# Patient Record
Sex: FEMALE | Race: WHITE | NOT HISPANIC OR LATINO | Employment: FULL TIME | URBAN - METROPOLITAN AREA
[De-identification: names, ages, dates, MRNs, and addresses within clinical notes are randomized per-mention and may not be internally consistent; named-entity substitution may affect disease eponyms.]

---

## 2018-08-01 ENCOUNTER — TELEPHONE (OUTPATIENT)
Dept: PAIN MEDICINE | Facility: MEDICAL CENTER | Age: 38
End: 2018-08-01

## 2018-08-01 NOTE — TELEPHONE ENCOUNTER
Patient called stating she is being referred by Dr Tiki Davila  Patient has [facility] id# [id number]  No prior pain mgmt  Not WC/ MVA related  PCP Dr Sacha Garcia @ [phone]     Per office ok to overbook patient to be seen tomorrow @ 8am  Patient will arrive early to complete forms  Patient will also bring office notes from Dr Tiki Davila and a copy of her MRI

## 2018-08-02 ENCOUNTER — OFFICE VISIT (OUTPATIENT)
Dept: PAIN MEDICINE | Facility: CLINIC | Age: 38
End: 2018-08-02
Payer: COMMERCIAL

## 2018-08-02 VITALS
SYSTOLIC BLOOD PRESSURE: 112 MMHG | DIASTOLIC BLOOD PRESSURE: 74 MMHG | HEART RATE: 85 BPM | HEIGHT: 64 IN | BODY MASS INDEX: 23.05 KG/M2 | WEIGHT: 135 LBS

## 2018-08-02 DIAGNOSIS — M54.16 LUMBAR RADICULOPATHY: ICD-10-CM

## 2018-08-02 DIAGNOSIS — M51.26 LUMBAR DISC HERNIATION: Primary | ICD-10-CM

## 2018-08-02 PROCEDURE — 99244 OFF/OP CNSLTJ NEW/EST MOD 40: CPT | Performed by: ANESTHESIOLOGY

## 2018-08-02 RX ORDER — DIAZEPAM 5 MG/1
TABLET ORAL
Refills: 0 | COMMUNITY
Start: 2018-07-28 | End: 2018-09-14

## 2018-08-02 RX ORDER — LIDOCAINE 50 MG/G
PATCH TOPICAL
Refills: 1 | COMMUNITY
Start: 2018-07-28 | End: 2018-11-27

## 2018-08-02 RX ORDER — CYCLOBENZAPRINE HCL 10 MG
TABLET ORAL
Refills: 0 | COMMUNITY
Start: 2018-07-28 | End: 2018-08-14 | Stop reason: ALTCHOICE

## 2018-08-02 NOTE — PROGRESS NOTES
Assessment:  1  Lumbar disc herniation    2  Lumbar radiculopathy - Left        Plan:    The patient's pain persists despite time, relative rest, activity modification and therapy  I believe that she would benefit from a lumbar epidural steroid injection to diminish any inflammatory component of her pain  I will initially use a transforaminal approach to better concentrate the steroid along the affected nerve root, left L4/L5  The injection may need to be repeated based on the degree of pain relief following the initial injection  In the office today, we reviewed the nature of the patient's pathology in depth using  diagrams and models  I discussed the approach I would use for the epidural steroid injection and provided literature for home review  The patient understands the risks associated with the procedure including but not limited to bleeding, infection, tissue injury, exacerbation of symptoms, allergic reaction, spinal headache, and paralysis and provided written and verbal consent  today  My impressions and treatment recommendations were discussed in detail with the patient who verbalized understanding and had no further questions  Discharge instructions were provided  I personally saw and examined the patient and I agree with the above discussed plan of care  Orders Placed This Encounter   Procedures    FL spine and pain procedure     Standing Status:   Future     Standing Expiration Date:   8/2/2022     Order Specific Question:   Reason for Exam:     Answer:   Left L4/L5 transforaminal epidural steroid injection #1     Order Specific Question:   Is the patient pregnant? Answer:   Unknown     Order Specific Question:   Anticoagulant hold needed?      Answer:   no    FL spine and pain procedure     Standing Status:   Future     Standing Expiration Date:   8/2/2022     Order Specific Question:   Reason for Exam:     Answer:   Left L4/L5 transforaminal epidural steroid injection #2 Order Specific Question:   Is the patient pregnant? Answer:   Unknown     Order Specific Question:   Anticoagulant hold needed? Answer:   no     New Medications Ordered This Visit   Medications    lidocaine (LIDODERM) 5 %     Sig: YENIFER 1 PATCH TOPICALLY D  MAY WEAR UP TO 12 HOURS  Refill:  1    diazepam (VALIUM) 5 mg tablet     Sig: TK 1 T PO  TID PRF MUSCLE SPASM     Refill:  0    cyclobenzaprine (FLEXERIL) 10 mg tablet     Sig: TK 1 T PO  TID PRF MUSCLE SPASMS     Refill:  0       Referred by Pepe       History of Present Illness:    Tree Viramontes is a 41 y o  female who in 2015 fell over step ladder on her back went to the emergency department she had a flare-up of her pain over the years her pain is been worsening into recently she was hospitalized 1 week ago  She has started physical therapy but she has severe pain radiating down the posterior lateral aspect of her left leg which she describes cramping shooting sharp with a numbing sensation  She notes that lying down and relaxation decreases symptoms while bending sitting walking exercise all aggravate them  Her pain is severe she rates it between 6 to 10/10 on the visual analog scale significantly interfering with daily living activities  Her pain is constant worse in the morning in the evening she has a cane walker for ambulation  Physical therapy heat nice provides moderate relief for TENS unit provides excellent relief  She is taking nonsteroidal anti-inflammatories muscle relaxants and lidocaine patch  I have personally reviewed and/or updated the patient's past medical history, past surgical history, family history, social history, current medications, allergies, and vital signs today  Review of Systems:    Review of Systems   Constitutional: Negative for fever and unexpected weight change  HENT: Negative for trouble swallowing  Eyes: Negative for visual disturbance     Respiratory: Negative for shortness of breath and wheezing  Cardiovascular: Negative for chest pain and palpitations  Gastrointestinal: Negative for constipation, diarrhea, nausea and vomiting  Endocrine: Negative for cold intolerance, heat intolerance and polydipsia  Genitourinary: Negative for difficulty urinating and frequency  Musculoskeletal: Positive for gait problem and joint swelling (joint stiffness)  Negative for arthralgias and myalgias  Skin: Negative for rash  Neurological: Positive for dizziness and weakness  Negative for seizures, syncope and headaches  Hematological: Does not bruise/bleed easily  Psychiatric/Behavioral: Positive for dysphoric mood  All other systems reviewed and are negative  Patient Active Problem List   Diagnosis    Lumbar radiculopathy - Left    Lumbar disc herniation       No past medical history on file  Past Surgical History:   Procedure Laterality Date     SECTION         Family History   Problem Relation Age of Onset    Cancer Father        Social History     Occupational History    Not on file  Social History Main Topics    Smoking status: Current Every Day Smoker    Smokeless tobacco: Never Used    Alcohol use Yes    Drug use: Yes     Types: Marijuana    Sexual activity: Not Currently       No current outpatient prescriptions on file prior to visit  No current facility-administered medications on file prior to visit  Allergies   Allergen Reactions    Percocet [Oxycodone-Acetaminophen]        Physical Exam:    /74   Pulse 85   Ht 5' 4" (1 626 m)   Wt 61 2 kg (135 lb)   BMI 23 17 kg/m²     Constitutional: normal, well developed, well nourished, alert, in no distress and non-toxic and no overt pain behavior    Eyes: anicteric  HEENT: grossly intact  Neck: supple, symmetric, trachea midline and no masses   Pulmonary:even and unlabored  Cardiovascular:No edema or pitting edema present  Skin:Normal without rashes or lesions and well hydrated  Psychiatric:Mood and affect appropriate  Neurologic:Cranial Nerves II-XII grossly intact  Musculoskeletal:  Difficulty going from sitting to standing to sitting position, no obvious skin lesions or erythema lumbar sacral spine mild tenderness along the left PSIS negative on the right no paraspinal tenderness sacroiliac tenderness or greater trochanteric tenderness bilateral, deep tendon reflexes diminished left patella compared to the right symmetrical bilateral Achilles no sensory deficits are appreciated no focal motor deficits are appreciated the lower limbs positive straight leg raising on the left negative on the right    Imaging  MRI lumbar spine wo con 07/27/18             REPORT STATUS: Signed  Indication: Low back  Left leg pain  Comparison:None     Findings: Multiplanar MR examination of the lumbar spine performed  The termination of the conus medullaris is at the level of L1  The conus is normal in appearance  There is a 2 2 cm cyst/dominant follicle at the left ovary  L1-L2 interspace: The disc is well maintained  There is no disc bulging or herniation  The spinal canal is of normal dimensions  The neural foramina are patent  L2-L3 interspace: The disc is well-maintained  There is no disc bulging or herniation  The spinal canal and neural foramina are patent  L3-L4 interspace: There is degenerative disc desiccation  There is a small central herniation of disc material  The protruding disc impresses on the ventral aspect of the thecal sac  There is no critical central canal stenosis or foraminal stenosis  L4-L5 interspace: There is a large left paracentral herniation of disc material  There is significant central left and compression on the thecal sac  There is stenosis at the left lateral recess (image 4/series 5 corresponding to images 11-12/series 9)   There is Modic type I marrow reactive signal at the anterior inferior endplate of L4 and the anterior superior endplate of L5    L5-S1 interspace: There is a small central disc herniation  The disc protrudes into the normally prominent epidural fat  There is no impression on the thecal sac  There is no nerve root impingement identified  Conclusion:   1  Large left paracentral HNP, L4-L5 level  2  Small central disc herniations, L3-L4 and L5-S1 levels  XR lumbar spine X-Ray 07/26/18                Indication: Pain    AP, lateral and bilateral oblique views of the lumbar spine without comparison  Bone density is normal  No fracture, subluxation or bone lesion  There is loss of the normal lumbar lordosis  There is mild narrowing of the disc space at L4-5 with small endplate osteophytes  No spondylolysis  Sacroiliac joints appear normal and symmetric without erosions  Sacrum appears unremarkable  Impression:    Mild degenerative disc disease at L4-5  I have personally reviewed pertinent films in PACS

## 2018-08-14 ENCOUNTER — HOSPITAL ENCOUNTER (OUTPATIENT)
Dept: RADIOLOGY | Facility: CLINIC | Age: 38
Discharge: HOME | End: 2018-08-14
Payer: COMMERCIAL

## 2018-08-14 VITALS
RESPIRATION RATE: 18 BRPM | HEART RATE: 74 BPM | OXYGEN SATURATION: 100 % | TEMPERATURE: 97.6 F | DIASTOLIC BLOOD PRESSURE: 76 MMHG | SYSTOLIC BLOOD PRESSURE: 118 MMHG

## 2018-08-14 DIAGNOSIS — M62.838 MUSCLE SPASM: Primary | ICD-10-CM

## 2018-08-14 DIAGNOSIS — M54.16 LUMBAR RADICULOPATHY: ICD-10-CM

## 2018-08-14 DIAGNOSIS — M51.26 LUMBAR DISC HERNIATION: ICD-10-CM

## 2018-08-14 PROCEDURE — 64484 NJX AA&/STRD TFRM EPI L/S EA: CPT | Performed by: ANESTHESIOLOGY

## 2018-08-14 PROCEDURE — 64483 NJX AA&/STRD TFRM EPI L/S 1: CPT | Performed by: ANESTHESIOLOGY

## 2018-08-14 RX ORDER — CYCLOBENZAPRINE HCL 10 MG
10 TABLET ORAL 3 TIMES DAILY PRN
Qty: 60 TABLET | Refills: 0 | Status: SHIPPED | OUTPATIENT
Start: 2018-08-14 | End: 2018-09-14 | Stop reason: SDUPTHER

## 2018-08-14 RX ORDER — PAPAVERINE HCL 150 MG
20 CAPSULE, EXTENDED RELEASE ORAL ONCE
Status: COMPLETED | OUTPATIENT
Start: 2018-08-14 | End: 2018-08-14

## 2018-08-14 RX ORDER — IBUPROFEN 200 MG
TABLET ORAL EVERY 6 HOURS PRN
COMMUNITY
End: 2019-10-18

## 2018-08-14 RX ORDER — LIDOCAINE HYDROCHLORIDE 10 MG/ML
5 INJECTION, SOLUTION EPIDURAL; INFILTRATION; INTRACAUDAL; PERINEURAL ONCE
Status: COMPLETED | OUTPATIENT
Start: 2018-08-14 | End: 2018-08-14

## 2018-08-14 RX ORDER — COVID-19 ANTIGEN TEST
220 KIT MISCELLANEOUS AS NEEDED
COMMUNITY
End: 2019-10-18

## 2018-08-14 RX ADMIN — IOHEXOL 1 ML: 300 INJECTION, SOLUTION INTRAVENOUS at 10:45

## 2018-08-14 RX ADMIN — LIDOCAINE HYDROCHLORIDE 3 ML: 10 INJECTION, SOLUTION EPIDURAL; INFILTRATION; INTRACAUDAL; PERINEURAL at 10:33

## 2018-08-14 RX ADMIN — DEXAMETHASONE SODIUM PHOSPHATE 20 MG: 10 INJECTION, SOLUTION INTRAMUSCULAR; INTRAVENOUS at 10:33

## 2018-08-14 RX ADMIN — LIDOCAINE HYDROCHLORIDE 3 ML: 20 INJECTION, SOLUTION EPIDURAL; INFILTRATION; INTRACAUDAL at 10:34

## 2018-08-14 NOTE — DISCHARGE INSTR - LAB
Epidural Steroid Injection   WHAT YOU NEED TO KNOW:   An epidural steroid injection (PATRICIA) is a procedure to inject steroid medicine into the epidural space  The epidural space is between your spinal cord and vertebrae  Steroids reduce inflammation and fluid buildup in your spine that may be causing pain  You may be given pain medicine along with the steroids  ACTIVITY  · Do not drive or operate machinery today  · No strenuous activity today - bending, lifting, etc   · You may resume normal activites starting tomorrow - start slowly and as tolerated  · You may shower today, but no tub baths or hot tubs  · You may have numbness for several hours from the local anesthetic  Please use caution and common sense, especially with weight-bearing activities  CARE OF THE INJECTION SITE  · If you have soreness or pain, apply ice to the area today (20 minutes on/20 minutes off)  · Starting tomorrow, you may use warm, moist heat or ice if needed  · You may have an increase or change in your discomfort for 36-48 hours after your treatment  · Apply ice and continue with any pain medication you have been prescribed  · Notify [facility] if you have any of the following: redness, drainage, swelling, headache, stiff neck or fever above 100°F     SPECIAL INSTRUCTIONS  · Our office will contact you in approximately 7 days for a progress report  MEDICATIONS  · Continue to take all routine medications  · Our office may have instructed you to hold some medications  If you have a problem specifically related to your procedure, please call our office at [phone]  Problems not related to your procedure should be directed to your primary care physician

## 2018-08-14 NOTE — H&P
History of Present Illness: The patient is a 41 y o  female who presents with complaints of low back and leg pain  Patient Active Problem List   Diagnosis    Lumbar radiculopathy - Left    Lumbar disc herniation       No past medical history on file  Past Surgical History:   Procedure Laterality Date     SECTION           Current Outpatient Prescriptions:     ibuprofen (MOTRIN) 200 mg tablet, Take by mouth every 6 (six) hours as needed for mild pain, Disp: , Rfl:     Naproxen Sodium (ALEVE) 220 MG CAPS, Take 220 mg by mouth as needed, Disp: , Rfl:     cyclobenzaprine (FLEXERIL) 10 mg tablet, TK 1 T PO  TID PRF MUSCLE SPASMS, Disp: , Rfl: 0    diazepam (VALIUM) 5 mg tablet, TK 1 T PO  TID PRF MUSCLE SPASM, Disp: , Rfl: 0    lidocaine (LIDODERM) 5 %, YENIFER 1 PATCH TOPICALLY D  MAY WEAR UP TO 12 HOURS , Disp: , Rfl: 1    Allergies   Allergen Reactions    Percocet [Oxycodone-Acetaminophen]        Physical Exam:   Vitals:    18 1013   BP: 118/76   Pulse: 74   Resp: 18   Temp: 97 6 °F (36 4 °C)   SpO2: 100%     General: Awake, Alert, Oriented x 3, Mood and affect appropriate  Respiratory: Respirations even and unlabored  Cardiovascular: Peripheral pulses intact; no edema  Musculoskeletal Exam:  Decreased range of motion lumbar spine    ASA Score: I    Patient/Chart Verification  Patient ID Verified: Verbal  Consents Confirmed: Procedural, To be obtained in the Pre-Procedure area  H&P( within 30 days) Verified: To be obtained in the Pre-Procedure area  Interval H&P(within 24 hr) Complete (required for Outpatients and Surgery Admit only): To be obtained in the Pre-Procedure area  Allergies Reviewed: Yes  Anticoag/NSAID held?: NA  Currently on antibiotics?: No  Pregnancy denied?: Yes    Assessment:   1  Lumbar disc herniation    2   Lumbar radiculopathy - Left        Plan: Left L4/L5 transforaminal epidural steroid injection #1

## 2018-08-23 ENCOUNTER — TELEPHONE (OUTPATIENT)
Dept: RADIOLOGY | Facility: CLINIC | Age: 38
End: 2018-08-23

## 2018-08-23 NOTE — TELEPHONE ENCOUNTER
S/w pt to obtain her new [facility] [employer] that pt states should be effective 8/19/2018 for her upcoming # 2 TFESI procedure  Pt states she did not receive the information as of yet, advised pt to try to obtain and rtc to [facility]  so we can start the authorization process for her procedure or we might have to r/s her appt  Pt states she will try to get

## 2018-08-28 NOTE — TELEPHONE ENCOUNTER
Called pt she did not receive her insurance card yet, checked the [employer] and obtained ID #, started auth with [employer] then called [place] to complete the eligibility process with [employer] and got authorization approved for proc on 8/30/2018

## 2018-08-30 ENCOUNTER — HOSPITAL ENCOUNTER (OUTPATIENT)
Dept: RADIOLOGY | Facility: CLINIC | Age: 38
Discharge: HOME | End: 2018-08-30
Payer: COMMERCIAL

## 2018-08-30 VITALS
HEART RATE: 84 BPM | TEMPERATURE: 98.2 F | SYSTOLIC BLOOD PRESSURE: 132 MMHG | DIASTOLIC BLOOD PRESSURE: 79 MMHG | WEIGHT: 135 LBS | OXYGEN SATURATION: 100 % | BODY MASS INDEX: 23.17 KG/M2 | RESPIRATION RATE: 18 BRPM

## 2018-08-30 DIAGNOSIS — M51.26 LUMBAR DISC HERNIATION: ICD-10-CM

## 2018-08-30 DIAGNOSIS — M54.16 LUMBAR RADICULOPATHY: ICD-10-CM

## 2018-08-30 PROCEDURE — 64483 NJX AA&/STRD TFRM EPI L/S 1: CPT | Performed by: ANESTHESIOLOGY

## 2018-08-30 PROCEDURE — 64484 NJX AA&/STRD TFRM EPI L/S EA: CPT | Performed by: ANESTHESIOLOGY

## 2018-08-30 RX ORDER — PAPAVERINE HCL 150 MG
20 CAPSULE, EXTENDED RELEASE ORAL ONCE
Status: DISCONTINUED | OUTPATIENT
Start: 2018-08-30 | End: 2018-09-03 | Stop reason: HOSPADM

## 2018-08-30 RX ORDER — ESCITALOPRAM OXALATE 10 MG/1
10 TABLET ORAL DAILY
COMMUNITY

## 2018-08-30 RX ORDER — LIDOCAINE HYDROCHLORIDE 10 MG/ML
5 INJECTION, SOLUTION EPIDURAL; INFILTRATION; INTRACAUDAL; PERINEURAL ONCE
Status: DISCONTINUED | OUTPATIENT
Start: 2018-08-30 | End: 2018-09-03 | Stop reason: HOSPADM

## 2018-08-30 RX ADMIN — IOHEXOL 1 ML: 300 INJECTION, SOLUTION INTRAVENOUS at 11:15

## 2018-08-30 NOTE — H&P
History of Present Illness: The patient is a 41 y o  female who presents with complaints of low back and leg pain  Patient Active Problem List   Diagnosis    Lumbar radiculopathy - Left    Lumbar disc herniation       No past medical history on file  Past Surgical History:   Procedure Laterality Date     SECTION           Current Outpatient Prescriptions:     cyclobenzaprine (FLEXERIL) 10 mg tablet, Take 1 tablet (10 mg total) by mouth 3 (three) times a day as needed for muscle spasms, Disp: 60 tablet, Rfl: 0    diazepam (VALIUM) 5 mg tablet, TK 1 T PO  TID PRF MUSCLE SPASM, Disp: , Rfl: 0    ibuprofen (MOTRIN) 200 mg tablet, Take by mouth every 6 (six) hours as needed for mild pain, Disp: , Rfl:     lidocaine (LIDODERM) 5 %, YENIFER 1 PATCH TOPICALLY D  MAY WEAR UP TO 12 HOURS , Disp: , Rfl: 1    Naproxen Sodium (ALEVE) 220 MG CAPS, Take 220 mg by mouth as needed, Disp: , Rfl:     Allergies   Allergen Reactions    Percocet [Oxycodone-Acetaminophen]        Physical Exam:   Vitals:    18 1057   BP: 114/78   Pulse: 87   Resp: 18   Temp: 98 2 °F (36 8 °C)   SpO2: 100%     General: Awake, Alert, Oriented x 3, Mood and affect appropriate  Respiratory: Respirations even and unlabored  Cardiovascular: Peripheral pulses intact; no edema  Musculoskeletal Exam:  Decreased range of motion lumbar spine    ASA Score: II    Patient/Chart Verification  Patient ID Verified: Verbal  ID Band Applied: No  Consents Confirmed: To be obtained in the Pre-Procedure area  H&P( within 30 days) Verified: To be obtained in the Pre-Procedure area  Interval H&P(within 24 hr) Complete (required for Outpatients and Surgery Admit only): To be obtained in the Pre-Procedure area  Allergies Reviewed: Yes  Anticoag/NSAID held?: NA  Currently on antibiotics?: No  Pregnancy denied?: Yes  Beta Blocker given : N/A  Does Patient Have a Prosthetic Device/Implant: No    Assessment:   1  Lumbar disc herniation    2   Lumbar radiculopathy - Left        Plan: Left L4/L5 transforaminal epidural steroid injection #2

## 2018-08-30 NOTE — DISCHARGE INSTRUCTIONS
Epidural Steroid Injection   WHAT YOU NEED TO KNOW:   An epidural steroid injection (PATRICIA) is a procedure to inject steroid medicine into the epidural space  The epidural space is between your spinal cord and vertebrae  Steroids reduce inflammation and fluid buildup in your spine that may be causing pain  You may be given pain medicine along with the steroids  ACTIVITY  · Do not drive or operate machinery today  · No strenuous activity today - bending, lifting, etc   · You may resume normal activites starting tomorrow - start slowly and as tolerated  · You may shower today, but no tub baths or hot tubs  · You may have numbness for several hours from the local anesthetic  Please use caution and common sense, especially with weight-bearing activities  CARE OF THE INJECTION SITE  · If you have soreness or pain, apply ice to the area today (20 minutes on/20 minutes off)  · Starting tomorrow, you may use warm, moist heat or ice if needed  · You may have an increase or change in your discomfort for 36-48 hours after your treatment  · Apply ice and continue with any pain medication you have been prescribed  · [facility]ify [facility] if you have any of the following: redness, drainage, swelling, headache, stiff neck or fever above 100°F     SPECIAL INSTRUCTIONS  · Our office will contact you in approximately 7 days for a progress report  MEDICATIONS  · Continue to take all routine medications  · Our office may have instructed you to hold some medications  If you have a problem specifically related to your procedure, please call our office at [phone]  Problems not related to your procedure should be directed to your primary care physician

## 2018-09-06 ENCOUNTER — TELEPHONE (OUTPATIENT)
Dept: PAIN MEDICINE | Facility: CLINIC | Age: 38
End: 2018-09-06

## 2018-09-06 NOTE — TELEPHONE ENCOUNTER
Telephone note   Reason for the call    Post Op F/u [facility] [place]  LMTCB w/pain level and % of relief  1st attempt   S/P LT L4/5 TFESI #2 on 8/30 w/SL in [place]      Follow up with DG on 9/14/18

## 2018-09-14 ENCOUNTER — OFFICE VISIT (OUTPATIENT)
Dept: PAIN MEDICINE | Facility: CLINIC | Age: 38
End: 2018-09-14
Payer: COMMERCIAL

## 2018-09-14 VITALS
SYSTOLIC BLOOD PRESSURE: 128 MMHG | DIASTOLIC BLOOD PRESSURE: 80 MMHG | HEIGHT: 64 IN | WEIGHT: 132 LBS | BODY MASS INDEX: 22.53 KG/M2 | HEART RATE: 84 BPM

## 2018-09-14 DIAGNOSIS — M51.26 LUMBAR DISC HERNIATION: ICD-10-CM

## 2018-09-14 DIAGNOSIS — M54.16 LUMBAR RADICULOPATHY: ICD-10-CM

## 2018-09-14 DIAGNOSIS — M54.42 CHRONIC LEFT-SIDED LOW BACK PAIN WITH LEFT-SIDED SCIATICA: Primary | ICD-10-CM

## 2018-09-14 DIAGNOSIS — M62.838 MUSCLE SPASM: ICD-10-CM

## 2018-09-14 DIAGNOSIS — G89.29 CHRONIC LEFT-SIDED LOW BACK PAIN WITH LEFT-SIDED SCIATICA: Primary | ICD-10-CM

## 2018-09-14 PROCEDURE — 99214 OFFICE O/P EST MOD 30 MIN: CPT | Performed by: NURSE PRACTITIONER

## 2018-09-14 RX ORDER — GABAPENTIN 300 MG/1
CAPSULE ORAL
Qty: 60 CAPSULE | Refills: 1 | Status: SHIPPED | OUTPATIENT
Start: 2018-09-14 | End: 2018-11-27 | Stop reason: SDUPTHER

## 2018-09-14 RX ORDER — CYCLOBENZAPRINE HCL 10 MG
10 TABLET ORAL 3 TIMES DAILY PRN
Qty: 60 TABLET | Refills: 1 | Status: SHIPPED | OUTPATIENT
Start: 2018-09-14 | End: 2018-11-27 | Stop reason: SDUPTHER

## 2018-09-14 NOTE — H&P
Assessment:  1  Chronic left-sided low back pain with left-sided sciatica    2  Lumbar disc herniation    3  Lumbar radiculopathy - Left    4  Muscle spasm        Plan:  While the patient was in the office today, I did have a thorough conversation with the patient regarding her medication regimen and treatment plan  I explained to the patient at this point time since she is noting moderate and stable relief and I still feel that there is a significant inflammatory component, I feel would be in her best interest to proceed with a repeat left L4 and L5 transforaminal epidural steroid injection with Dr Ana Silvestre  The patient was agreeable and verbalized an understanding  Complete risks and benefits including bleeding, infection, tissue reaction, nerve injury and allergic reaction were discussed  The approach was demonstrated using models and literature was provided  Verbal and written consent was obtained  While the patient was in the office today, I discussed with the patient at this point time as she is still trying to do everything she can to avoid surgical options, I do feel there is definitely significant neuropathic component to her pain symptoms and since 1 of her biggest issues is her sleep being interrupted at nighttime, I feel that she may benefit from a neuron membrane stabilizer such as gabapentin  I discussed with the patient the type of medication it is, how it works, and that it requires a titration process that is specific to each individual  I reviewed with the patient that it may take 3-4 weeks for the medication's effects to be noticed and that it should never be abruptly stopped  Possible side effects include but are not limited to; vertigo, lethargy, nausea, and edema of the extremities  Advised the patient to call our office if they experience any side effects     At this point we will start her on 300 mg at bedtime and then increase her to 600 mg at bedtime over the next 2 months and see how she does  I also reviewed with the patient this safe dosages of both the naproxen and ibuprofen and that she is under those dosage is, but I agree that long-term it would be in her best interest to not take them on a daily basis and only as needed and that hopefully with the repeat injection and the addition of the gabapentin she might be able to discontinue her use of those medications  The patient verbalized an understanding  The patient will follow-up in 8 weeks for medication prescription refill and reevaluation  The patient was advised to contact the office should their symptoms worsen in the interim  The patient was agreeable and verbalized an understanding  History of Present Illness: The patient is a 41 y o  female last seen on 8/30/18 who presents for a follow up office visit in regards to chronic pain secondary to herniated lumbar disc with radiculopathy  The patient currently reports that since her 2nd left L4 and L5 transforaminal epidural steroid injection on August 30, 2018 with Dr Ana Silvestre, she is noting at least a 50% improvement in her back and left lower extremity radicular symptoms  She does feel that she is still compensating for the pain and that her hips are still out of alignment, but that is improving and that her pain is definitely getting better  She does feel that the cyclobenzaprine and her use of naproxen and ibuprofen as needed is also helpful  However, she is concerned about the long-term use of NSAIDs and would like to discuss the next step in her treatment plan today  I have personally reviewed and/or updated the patient's past medical history, past surgical history, family history, social history, current medications, allergies, and vital signs today  Review of Systems:    Review of Systems   Respiratory: Negative for shortness of breath  Cardiovascular: Negative for chest pain     Gastrointestinal: Negative for constipation, diarrhea, nausea and vomiting  Musculoskeletal: Positive for gait problem  Negative for arthralgias, joint swelling and myalgias  Skin: Negative for rash  Neurological: Negative for dizziness, seizures and weakness  All other systems reviewed and are negative  No past medical history on file  Past Surgical History:   Procedure Laterality Date     SECTION         Family History   Problem Relation Age of Onset    Cancer Father        Social History     Occupational History    Not on file  Social History Main Topics    Smoking status: Current Every Day Smoker    Smokeless tobacco: Never Used    Alcohol use Yes    Drug use: Yes     Types: Marijuana    Sexual activity: Not Currently         Current Outpatient Prescriptions:     cyclobenzaprine (FLEXERIL) 10 mg tablet, Take 1 tablet (10 mg total) by mouth 3 (three) times a day as needed for muscle spasms, Disp: 60 tablet, Rfl: 0    diazepam (VALIUM) 5 mg tablet, TK 1 T PO  TID PRF MUSCLE SPASM, Disp: , Rfl: 0    escitalopram (LEXAPRO) 10 mg tablet, Take 10 mg by mouth daily, Disp: , Rfl:     ibuprofen (MOTRIN) 200 mg tablet, Take by mouth every 6 (six) hours as needed for mild pain, Disp: , Rfl:     lidocaine (LIDODERM) 5 %, YENIFER 1 PATCH TOPICALLY D  MAY WEAR UP TO 12 HOURS , Disp: , Rfl: 1    Naproxen Sodium (ALEVE) 220 MG CAPS, Take 220 mg by mouth as needed, Disp: , Rfl:     Allergies   Allergen Reactions    Percocet [Oxycodone-Acetaminophen]        Physical Exam:    There were no vitals taken for this visit  Constitutional:normal, well developed, well nourished, alert, in no distress and non-toxic and no overt pain behavior    Eyes:anicteric  HEENT:grossly intact  Neck:supple, symmetric, trachea midline and no masses   Pulmonary:even and unlabored  Cardiovascular:No edema or pitting edema present  Skin:Normal without rashes or lesions and well hydrated  Psychiatric:Mood and affect appropriate  Neurologic:Cranial Nerves II-XII grossly intact  Musculoskeletal:normal      Imaging  No orders to display         No orders of the defined types were placed in this encounter  Dieter Rodriguez

## 2018-09-14 NOTE — PROGRESS NOTES
Assessment:  1  Chronic left-sided low back pain with left-sided sciatica    2  Lumbar disc herniation    3  Lumbar radiculopathy - Left    4  Muscle spasm        Plan:  While the patient was in the office today, I did have a thorough conversation with the patient regarding her medication regimen and treatment plan  I explained to the patient at this point time since she is noting moderate and stable relief and I still feel that there is a significant inflammatory component, I feel would be in her best interest to proceed with a repeat left L4 and L5 transforaminal epidural steroid injection with Dr Clara Escalante  The patient was agreeable and verbalized an understanding  Complete risks and benefits including bleeding, infection, tissue reaction, nerve injury and allergic reaction were discussed  The approach was demonstrated using models and literature was provided  Verbal and written consent was obtained  While the patient was in the office today, I discussed with the patient at this point time as she is still trying to do everything she can to avoid surgical options, I do feel there is definitely significant neuropathic component to her pain symptoms and since 1 of her biggest issues is her sleep being interrupted at nighttime, I feel that she may benefit from a neuron membrane stabilizer such as gabapentin  I discussed with the patient the type of medication it is, how it works, and that it requires a titration process that is specific to each individual  I reviewed with the patient that it may take 3-4 weeks for the medication's effects to be noticed and that it should never be abruptly stopped  Possible side effects include but are not limited to; vertigo, lethargy, nausea, and edema of the extremities  Advised the patient to call our office if they experience any side effects     At this point we will start her on 300 mg at bedtime and then increase her to 600 mg at bedtime over the next 2 months and see how she does  I also reviewed with the patient this safe dosages of both the naproxen and ibuprofen and that she is under those dosage is, but I agree that long-term it would be in her best interest to not take them on a daily basis and only as needed and that hopefully with the repeat injection and the addition of the gabapentin she might be able to discontinue her use of those medications  The patient verbalized an understanding  The patient will follow-up in 8 weeks for medication prescription refill and reevaluation  The patient was advised to contact the office should their symptoms worsen in the interim  The patient was agreeable and verbalized an understanding  History of Present Illness: The patient is a 41 y o  female last seen on 8/30/18 who presents for a follow up office visit in regards to chronic pain secondary to herniated lumbar disc with radiculopathy  The patient currently reports that since her 2nd left L4 and L5 transforaminal epidural steroid injection on August 30, 2018 with Dr Joycelyn Barroso, she is noting at least a 50% improvement in her back and left lower extremity radicular symptoms  She does feel that she is still compensating for the pain and that her hips are still out of alignment, but that is improving and that her pain is definitely getting better  She does feel that the cyclobenzaprine and her use of naproxen and ibuprofen as needed is also helpful  However, she is concerned about the long-term use of NSAIDs and would like to discuss the next step in her treatment plan today  I have personally reviewed and/or updated the patient's past medical history, past surgical history, family history, social history, current medications, allergies, and vital signs today  Review of Systems:    Review of Systems   Respiratory: Negative for shortness of breath  Cardiovascular: Negative for chest pain     Gastrointestinal: Negative for constipation, diarrhea, nausea and vomiting  Musculoskeletal: Positive for gait problem  Negative for arthralgias, joint swelling and myalgias  Skin: Negative for rash  Neurological: Negative for dizziness, seizures and weakness  All other systems reviewed and are negative  No past medical history on file  Past Surgical History:   Procedure Laterality Date     SECTION         Family History   Problem Relation Age of Onset    Cancer Father        Social History     Occupational History    Not on file  Social History Main Topics    Smoking status: Current Every Day Smoker    Smokeless tobacco: Never Used    Alcohol use Yes    Drug use: Yes     Types: Marijuana    Sexual activity: Not Currently         Current Outpatient Prescriptions:     cyclobenzaprine (FLEXERIL) 10 mg tablet, Take 1 tablet (10 mg total) by mouth 3 (three) times a day as needed for muscle spasms, Disp: 60 tablet, Rfl: 0    diazepam (VALIUM) 5 mg tablet, TK 1 T PO  TID PRF MUSCLE SPASM, Disp: , Rfl: 0    escitalopram (LEXAPRO) 10 mg tablet, Take 10 mg by mouth daily, Disp: , Rfl:     ibuprofen (MOTRIN) 200 mg tablet, Take by mouth every 6 (six) hours as needed for mild pain, Disp: , Rfl:     lidocaine (LIDODERM) 5 %, YENIFER 1 PATCH TOPICALLY D  MAY WEAR UP TO 12 HOURS , Disp: , Rfl: 1    Naproxen Sodium (ALEVE) 220 MG CAPS, Take 220 mg by mouth as needed, Disp: , Rfl:     Allergies   Allergen Reactions    Percocet [Oxycodone-Acetaminophen]        Physical Exam:    There were no vitals taken for this visit  Constitutional:normal, well developed, well nourished, alert, in no distress and non-toxic and no overt pain behavior    Eyes:anicteric  HEENT:grossly intact  Neck:supple, symmetric, trachea midline and no masses   Pulmonary:even and unlabored  Cardiovascular:No edema or pitting edema present  Skin:Normal without rashes or lesions and well hydrated  Psychiatric:Mood and affect appropriate  Neurologic:Cranial Nerves II-XII grossly intact  Musculoskeletal:normal      Imaging  No orders to display         No orders of the defined types were placed in this encounter

## 2018-10-05 ENCOUNTER — TELEPHONE (OUTPATIENT)
Dept: RADIOLOGY | Facility: CLINIC | Age: 38
End: 2018-10-05

## 2018-10-05 NOTE — TELEPHONE ENCOUNTER
L/m for pt to rtc to [address] [place] [address] schedule as we have not received her new insurance for her upcoming procedure,    L/m stating procedure cancelled and once we recvd new insurance will r/s procedure if still needed

## 2018-11-12 ENCOUNTER — TELEPHONE (OUTPATIENT)
Dept: PAIN MEDICINE | Facility: CLINIC | Age: 38
End: 2018-11-12

## 2018-11-12 NOTE — TELEPHONE ENCOUNTER
Patient left a message with the Answering Service to cancel her appt  11/12/18 @ 8:15 AM with Brennen  The patient gave no reason for the cancellation  I called the patient & left a message on her voicemail to call the office to reschedule

## 2018-11-27 ENCOUNTER — OFFICE VISIT (OUTPATIENT)
Dept: PAIN MEDICINE | Facility: CLINIC | Age: 38
End: 2018-11-27
Payer: COMMERCIAL

## 2018-11-27 VITALS
BODY MASS INDEX: 22.88 KG/M2 | DIASTOLIC BLOOD PRESSURE: 78 MMHG | HEART RATE: 92 BPM | WEIGHT: 134 LBS | HEIGHT: 64 IN | SYSTOLIC BLOOD PRESSURE: 128 MMHG

## 2018-11-27 DIAGNOSIS — M51.26 LUMBAR DISC HERNIATION: ICD-10-CM

## 2018-11-27 DIAGNOSIS — G89.29 CHRONIC LEFT-SIDED LOW BACK PAIN WITH LEFT-SIDED SCIATICA: Primary | ICD-10-CM

## 2018-11-27 DIAGNOSIS — M62.838 MUSCLE SPASM: ICD-10-CM

## 2018-11-27 DIAGNOSIS — M54.16 LUMBAR RADICULOPATHY: ICD-10-CM

## 2018-11-27 DIAGNOSIS — M54.42 CHRONIC LEFT-SIDED LOW BACK PAIN WITH LEFT-SIDED SCIATICA: Primary | ICD-10-CM

## 2018-11-27 PROCEDURE — 99213 OFFICE O/P EST LOW 20 MIN: CPT | Performed by: NURSE PRACTITIONER

## 2018-11-27 RX ORDER — GABAPENTIN 300 MG/1
CAPSULE ORAL
Qty: 60 CAPSULE | Refills: 2 | Status: SHIPPED | OUTPATIENT
Start: 2018-11-27 | End: 2019-03-18 | Stop reason: SDUPTHER

## 2018-11-27 RX ORDER — CYCLOBENZAPRINE HCL 10 MG
TABLET ORAL
Qty: 30 TABLET | Refills: 2 | Status: SHIPPED | OUTPATIENT
Start: 2018-11-27 | End: 2019-07-25 | Stop reason: SDUPTHER

## 2018-11-27 NOTE — PROGRESS NOTES
Assessment:  1  Chronic left-sided low back pain with left-sided sciatica    2  Lumbar radiculopathy - Left    3  Lumbar disc herniation    4  Muscle spasm        Plan:  While the patient was in the office today, I did explain to her that tiredness is a normal side effect of gabapentin and she is on a subtherapeutic dose with significant relief, 1 option would be to try to split up the dose and take 1 at bedtime and 1 earlier in the day or she could also try taking 1 pill daily every other day and on the opposite days 2 pills to see if that keeps thing more manageable  However, we decided not to change the prescription in case her pain would worsen when she tries to decrease the gabapentin  The patient will call us if she has any side effects or issues  The patient was agreeable and verbalized an understanding  At this point since she is noting significant and stable relief and it is her wishes, we will hold off any repeat injections for now  However, the patient does understand that this is always a possibility in the future if we need to repeat injections  The patient will follow-up in 12 weeks for medication prescription refill and reevaluation  The patient was advised to contact the office should their symptoms worsen in the interim  The patient was agreeable and verbalized an understanding  History of Present Illness: The patient is a 41 y o  female last seen on 9/14/18 who presents for a follow up office visit in regards to chronic pain secondary to and lumbar disc herniation with radiculopathy  The patient currently reports that since her last office visit there has definitely been improvement in her pain symptoms as she feels the gabapentin is definitely helpful in her to the point that she even held off the 3rd epidural steroid injection and would like to continue holding off for now    She does feel that the gabapentin is helping as she is up to 600 mg at bedtime with some tiredness as the only side effect  Current pain medications includes:   Gabapentin 600 mg at bedtime  The patient reports that this regimen is providing 75-80% pain relief  The patient is reporting sleepiness from this pain medication regimen  I have personally reviewed and/or updated the patient's past medical history, past surgical history, family history, social history, current medications, allergies, and vital signs today  Review of Systems:    Review of Systems   Respiratory: Negative for shortness of breath  Cardiovascular: Negative for chest pain  Gastrointestinal: Negative for constipation, diarrhea, nausea and vomiting  Musculoskeletal: Negative for arthralgias, gait problem, joint swelling and myalgias  Skin: Negative for rash  Neurological: Positive for dizziness  Negative for seizures and weakness  All other systems reviewed and are negative  No past medical history on file  Past Surgical History:   Procedure Laterality Date     SECTION         Family History   Problem Relation Age of Onset    Cancer Father        Social History     Occupational History    Not on file       Social History Main Topics    Smoking status: Current Every Day Smoker    Smokeless tobacco: Never Used    Alcohol use Yes    Drug use: Yes     Types: Marijuana    Sexual activity: Not Currently         Current Outpatient Prescriptions:     cyclobenzaprine (FLEXERIL) 10 mg tablet, Take 1 tablet (10 mg total) by mouth 3 (three) times a day as needed for muscle spasms, Disp: 60 tablet, Rfl: 1    escitalopram (LEXAPRO) 10 mg tablet, Take 10 mg by mouth daily, Disp: , Rfl:     gabapentin (NEURONTIN) 300 mg capsule, Take 1 PO HS x 2 weeks, then 2 PO HS , Disp: 60 capsule, Rfl: 1    ibuprofen (MOTRIN) 200 mg tablet, Take by mouth every 6 (six) hours as needed for mild pain, Disp: , Rfl:     lidocaine (LIDODERM) 5 %, YENIFER 1 PATCH TOPICALLY D  MAY WEAR UP TO 12 HOURS , Disp: , Rfl: 1   Naproxen Sodium (ALEVE) 220 MG CAPS, Take 220 mg by mouth as needed, Disp: , Rfl:     Allergies   Allergen Reactions    Percocet [Oxycodone-Acetaminophen]        Physical Exam:    There were no vitals taken for this visit  Constitutional:normal, well developed, well nourished, alert, in no distress and non-toxic and no overt pain behavior  Eyes:anicteric  HEENT:grossly intact  Neck:supple, symmetric, trachea midline and no masses   Pulmonary:even and unlabored  Cardiovascular:No edema or pitting edema present  Skin:Normal without rashes or lesions and well hydrated  Psychiatric:Mood and affect appropriate  Neurologic:Cranial Nerves II-XII grossly intact  Musculoskeletal:normal      Imaging  No orders to display         No orders of the defined types were placed in this encounter

## 2019-02-22 DIAGNOSIS — G89.29 CHRONIC LEFT-SIDED LOW BACK PAIN WITH LEFT-SIDED SCIATICA: ICD-10-CM

## 2019-02-22 DIAGNOSIS — M54.16 LUMBAR RADICULOPATHY: ICD-10-CM

## 2019-02-22 DIAGNOSIS — M54.42 CHRONIC LEFT-SIDED LOW BACK PAIN WITH LEFT-SIDED SCIATICA: ICD-10-CM

## 2019-02-22 RX ORDER — GABAPENTIN 300 MG/1
CAPSULE ORAL
Qty: 60 CAPSULE | Refills: 0 | OUTPATIENT
Start: 2019-02-22

## 2019-03-18 ENCOUNTER — TELEPHONE (OUTPATIENT)
Dept: PAIN MEDICINE | Facility: CLINIC | Age: 39
End: 2019-03-18

## 2019-03-18 DIAGNOSIS — G89.29 CHRONIC LEFT-SIDED LOW BACK PAIN WITH LEFT-SIDED SCIATICA: ICD-10-CM

## 2019-03-18 DIAGNOSIS — M54.42 CHRONIC LEFT-SIDED LOW BACK PAIN WITH LEFT-SIDED SCIATICA: ICD-10-CM

## 2019-03-18 DIAGNOSIS — M54.16 LUMBAR RADICULOPATHY: ICD-10-CM

## 2019-03-18 RX ORDER — GABAPENTIN 400 MG/1
CAPSULE ORAL
Qty: 30 CAPSULE | Refills: 0 | Status: SHIPPED | OUTPATIENT
Start: 2019-03-18 | End: 2019-04-01 | Stop reason: SDUPTHER

## 2019-03-18 NOTE — TELEPHONE ENCOUNTER
Mistaken entry:     Pt confirmed 300 mg 2 pills po qhs w/ + relief, tired  S/w pt, questioned decreasing to 400 mg 1 pill po qhs  Hopefully this will help with her pain and the se's  Pt verbalized  agreement  Advised pt, anticipate this rx will be sent to the pharmacy for pu today  This office will cb if there is any change in the plan as discussed  Please cb if questions or concerns arise and fu as scheduled on 4/1  Pt verbalized understanding and appreciation

## 2019-03-18 NOTE — TELEPHONE ENCOUNTER
I e-scribed a script for Gabapentin 400 mg HS, 30 day supply to her pharmacy listed  This should get her to her OV as scheduled  Thank you

## 2019-03-18 NOTE — TELEPHONE ENCOUNTER
S/w pt, stated that she has been taking 2 pills at hs w/ + relief, but tired  Pt stated taht she decreased to 1 pill at hs ~ 1 week ago when she had to change her ov and realized that she was going to run out of medication  Pt stated that she skipped a couple pills, ld on Friday, #3 pills on hand  Per pt, increased right sided back pain, improved drowsiness w/ 1 pill at hs  Advised pt, will d/w DG  Anticipate a refill - gabapentin 200 mg 2 pills at hs, will be sent to the pharmacy on file for pu later today  This office will cb if there is any change in the plan as discussed  Pt verbalized understanding and confirmed 4/1 ov

## 2019-03-18 NOTE — TELEPHONE ENCOUNTER
Patient is calling because her next appointment is 04/01 and she is out of the Gabapentin  She is asking if she can receive a refill prior to her appointment? She can be reached at #[phone]

## 2019-03-18 NOTE — TELEPHONE ENCOUNTER
Can you please clarify that she was taking 300 mg as it says 200 mg in your note? Because if 600 mg is too much, maybe I can send a script for 400 mg so it will be less than 600 mg HS as she was taking 2, 300 mg pills, but more than just 300 mg so maybe it will help more? Let me know what she thinks  Thank you

## 2019-04-01 ENCOUNTER — OFFICE VISIT (OUTPATIENT)
Dept: PAIN MEDICINE | Facility: CLINIC | Age: 39
End: 2019-04-01
Payer: COMMERCIAL

## 2019-04-01 VITALS
WEIGHT: 142 LBS | SYSTOLIC BLOOD PRESSURE: 120 MMHG | DIASTOLIC BLOOD PRESSURE: 70 MMHG | BODY MASS INDEX: 24.24 KG/M2 | HEIGHT: 64 IN | HEART RATE: 68 BPM

## 2019-04-01 DIAGNOSIS — G89.29 CHRONIC LEFT-SIDED LOW BACK PAIN WITH LEFT-SIDED SCIATICA: Primary | ICD-10-CM

## 2019-04-01 DIAGNOSIS — M25.551 HIP PAIN, ACUTE, RIGHT: ICD-10-CM

## 2019-04-01 DIAGNOSIS — M51.26 LUMBAR DISC HERNIATION: ICD-10-CM

## 2019-04-01 DIAGNOSIS — M54.16 LUMBAR RADICULOPATHY: ICD-10-CM

## 2019-04-01 DIAGNOSIS — M54.42 CHRONIC LEFT-SIDED LOW BACK PAIN WITH LEFT-SIDED SCIATICA: Primary | ICD-10-CM

## 2019-04-01 PROCEDURE — 99213 OFFICE O/P EST LOW 20 MIN: CPT | Performed by: NURSE PRACTITIONER

## 2019-04-01 RX ORDER — GABAPENTIN 300 MG/1
CAPSULE ORAL
Qty: 60 CAPSULE | Refills: 2 | Status: SHIPPED | OUTPATIENT
Start: 2019-04-01 | End: 2019-07-25 | Stop reason: SDUPTHER

## 2019-04-09 ENCOUNTER — TELEPHONE (OUTPATIENT)
Dept: PAIN MEDICINE | Facility: CLINIC | Age: 39
End: 2019-04-09

## 2019-06-28 DIAGNOSIS — M54.42 CHRONIC LEFT-SIDED LOW BACK PAIN WITH LEFT-SIDED SCIATICA: ICD-10-CM

## 2019-06-28 DIAGNOSIS — G89.29 CHRONIC LEFT-SIDED LOW BACK PAIN WITH LEFT-SIDED SCIATICA: ICD-10-CM

## 2019-06-28 DIAGNOSIS — M54.16 LUMBAR RADICULOPATHY: ICD-10-CM

## 2019-06-28 RX ORDER — GABAPENTIN 300 MG/1
CAPSULE ORAL
Qty: 60 CAPSULE | Refills: 0 | OUTPATIENT
Start: 2019-06-28

## 2019-07-15 DIAGNOSIS — M54.16 LUMBAR RADICULOPATHY: ICD-10-CM

## 2019-07-15 DIAGNOSIS — G89.29 CHRONIC LEFT-SIDED LOW BACK PAIN WITH LEFT-SIDED SCIATICA: ICD-10-CM

## 2019-07-15 DIAGNOSIS — M54.42 CHRONIC LEFT-SIDED LOW BACK PAIN WITH LEFT-SIDED SCIATICA: ICD-10-CM

## 2019-07-16 RX ORDER — GABAPENTIN 300 MG/1
CAPSULE ORAL
Qty: 60 CAPSULE | Refills: 0 | OUTPATIENT
Start: 2019-07-16

## 2019-07-25 ENCOUNTER — TELEPHONE (OUTPATIENT)
Dept: PAIN MEDICINE | Facility: CLINIC | Age: 39
End: 2019-07-25

## 2019-07-25 DIAGNOSIS — M54.42 CHRONIC LEFT-SIDED LOW BACK PAIN WITH LEFT-SIDED SCIATICA: ICD-10-CM

## 2019-07-25 DIAGNOSIS — M54.16 LUMBAR RADICULOPATHY: ICD-10-CM

## 2019-07-25 DIAGNOSIS — M62.838 MUSCLE SPASM: ICD-10-CM

## 2019-07-25 DIAGNOSIS — G89.29 CHRONIC LEFT-SIDED LOW BACK PAIN WITH LEFT-SIDED SCIATICA: ICD-10-CM

## 2019-07-25 RX ORDER — CYCLOBENZAPRINE HCL 10 MG
TABLET ORAL
Qty: 30 TABLET | Refills: 0 | Status: SHIPPED | OUTPATIENT
Start: 2019-07-25 | End: 2019-10-18

## 2019-07-25 RX ORDER — GABAPENTIN 400 MG/1
CAPSULE ORAL
Qty: 60 CAPSULE | Refills: 0 | Status: SHIPPED | OUTPATIENT
Start: 2019-07-25 | End: 2019-09-03

## 2019-07-25 NOTE — TELEPHONE ENCOUNTER
S/w pt and advised the same  Pt chose to reschedule appt  7/30 appt canceled and rescheduled for 9/3  Pt states should be ok with gabapentin refill because she still has supply of 400mg tabs

## 2019-07-25 NOTE — TELEPHONE ENCOUNTER
RN attempted to reach pt regarding previous  [facility] w/ c/b number office hours and location provided  Message left to call back regarding medication questions that DOROTEO has

## 2019-07-25 NOTE — TELEPHONE ENCOUNTER
Patient left a message with the Answering Service to cancel her appt 7/25/19 @ 8:15 AM with Brennen  No reason was given for the cancellation  I called the patient and she told me she had a work conflict  i rescheduled her appt for 7/30/19 @ 2:15 PM but she is out of medication  I told her to call the office and ask to speak with a nurse

## 2019-07-25 NOTE — TELEPHONE ENCOUNTER
Patient states she is currently taking Gabapentin & is taking two 400 mg capsules a day & that seems to be helping a lot more  Her Flexeril she is taking intermittently  She is requesting her Gabapentin be increased to 400 mg twice a day  No side effects   Please advise, livier    Call back# [phone]

## 2019-07-25 NOTE — TELEPHONE ENCOUNTER
I changed the Gabapentin script to 400 mg, 2 PO HS and sent a 30 day script along with a refill for the Flexeril  She can keep her OV next week or see if there is something that would suit her better in 4 weeks before she would be out of medications  Whatever works for her  Thank you

## 2019-07-25 NOTE — TELEPHONE ENCOUNTER
RN s/w pt regarding previous  Per pt she ran out of the 300mg and still had the 400 mg caps left and took two of the 400 mg at HS the last few nights and has really been noticing how much better she has been feeling  Pt would like the 400 mg caps take 2 caps at HS for a total of 800 mg at HS  Per pt she also takes her flexeril as needed and that also really works when she takes it, no sife effects noted  --please advise thank you-  Reorder gabapentin for 800 mg total at hs and could the flexeril be reordered as well? Per pt she is aware that she has an appt on 7/30 but some times has a hard time getting to the pharmacy

## 2019-07-25 NOTE — TELEPHONE ENCOUNTER
Can you please call her and find out if she is still taking the Gabapentin and Flexeril? Are they helping? Any side effects? Does she want to try to increase the gabapentin?

## 2019-08-21 DIAGNOSIS — M54.16 LUMBAR RADICULOPATHY: ICD-10-CM

## 2019-08-21 DIAGNOSIS — M54.42 CHRONIC LEFT-SIDED LOW BACK PAIN WITH LEFT-SIDED SCIATICA: ICD-10-CM

## 2019-08-21 DIAGNOSIS — G89.29 CHRONIC LEFT-SIDED LOW BACK PAIN WITH LEFT-SIDED SCIATICA: ICD-10-CM

## 2019-08-21 RX ORDER — GABAPENTIN 400 MG/1
CAPSULE ORAL
Qty: 60 CAPSULE | Refills: 0 | OUTPATIENT
Start: 2019-08-21

## 2019-09-03 ENCOUNTER — OFFICE VISIT (OUTPATIENT)
Dept: PAIN MEDICINE | Facility: CLINIC | Age: 39
End: 2019-09-03
Payer: COMMERCIAL

## 2019-09-03 VITALS
HEART RATE: 68 BPM | HEIGHT: 64 IN | WEIGHT: 132 LBS | BODY MASS INDEX: 22.53 KG/M2 | SYSTOLIC BLOOD PRESSURE: 118 MMHG | DIASTOLIC BLOOD PRESSURE: 64 MMHG

## 2019-09-03 DIAGNOSIS — G89.4 CHRONIC PAIN SYNDROME: Primary | ICD-10-CM

## 2019-09-03 DIAGNOSIS — M54.16 LUMBAR RADICULOPATHY: ICD-10-CM

## 2019-09-03 DIAGNOSIS — G89.29 CHRONIC LEFT-SIDED LOW BACK PAIN WITH LEFT-SIDED SCIATICA: ICD-10-CM

## 2019-09-03 DIAGNOSIS — M54.42 CHRONIC LEFT-SIDED LOW BACK PAIN WITH LEFT-SIDED SCIATICA: ICD-10-CM

## 2019-09-03 DIAGNOSIS — M51.26 LUMBAR DISC HERNIATION: ICD-10-CM

## 2019-09-03 DIAGNOSIS — M25.551 HIP PAIN, ACUTE, RIGHT: ICD-10-CM

## 2019-09-03 PROCEDURE — 99213 OFFICE O/P EST LOW 20 MIN: CPT | Performed by: NURSE PRACTITIONER

## 2019-09-03 RX ORDER — GABAPENTIN 400 MG/1
CAPSULE ORAL
Qty: 7 CAPSULE | Refills: 0 | Status: SHIPPED | OUTPATIENT
Start: 2019-09-03 | End: 2019-10-18

## 2019-09-03 NOTE — PATIENT INSTRUCTIONS
Gabapentin 400 mg: Take 1 pill every night x 5 days, then 1 Pill every other night x 4 days, then STOP GABAPENTIN  Discuss Lexapro with PCP    F/U with OBGYN regarding Cyclobenzaprien, but in the mean time, do not use if you can

## 2019-09-03 NOTE — PROGRESS NOTES
Assessment:  1  Chronic pain syndrome    2  Chronic left-sided low back pain with left-sided sciatica    3  Lumbar radiculopathy - Left    4  Lumbar disc herniation    5  Hip pain, acute, right        Plan:  While the patient was in the office today, I did have a thorough conversation with the patient regarding her chronic pain syndrome, symptoms, and treatment plan  I did explain to the patient that during pregnancy we typically do titrate patient's off of gabapentin as a precaution, even though it is a low risk category drug during pregnancy, I still feel it is in her best interest to titrate down and off of the gabapentin since she has been on it for a while and her pain is improved, hopefully her pain still remains tolerable and manageable  I did give the patient specific instructions on how to titrate off of the gabapentin  The patient was agreeable and verbalized an understanding  I also discussed with the patient that for now she should not use the cyclobenzaprine until she discusses it with her OB/GYN  The patient was agreeable and verbalized an understanding  I also advised the patient that she needs to call her primary care provider with regards to the Lexapro and should not take ibuprofen or naproxen and can only use acetaminophen for pain  The patient was agreeable and verbalized an understanding  I discussed with the patient that at this point time she can followup with our office on an as-needed basis  I did review the patient that if her pain symptoms should change, worsen, and/or if she would experience any new symptoms as she would like to be evaluated for, she should give our office a call  The patient was agreeable and verbalized an understanding  History of Present Illness: The patient is a 46 y o  female last seen on 4/1/19 who presents for a follow up office visit in regards to chronic pain syndrome secondary to lumbar disc herniation with radiculopathy  The patient currently reports that since her last office visit overall her pain symptoms have improved with the increase in the gabapentin, however, she reports that just yesterday she took a home pregnancy test/ which was positive, because she has been having symptoms of being pregnant for the past 6 weeks  The patient presents today to discuss her medication regimen as she knows that she will have to significantly change her medications because of the risks during pregnancy and presents today to discuss  She reports she has not yet followed up with her primary care provider OB/GYN, but does plan on doing so  Current pain medications includes:  Gabapentin 400 mg b i d  and Flexeril 10 mg at bedtime as needed for spasms  The patient reports that this regimen is providing 75% pain relief  The patient is reporting no side effects from this pain medication regimen  I have personally reviewed and/or updated the patient's past medical history, past surgical history, family history, social history, current medications, allergies, and vital signs today  Review of Systems:    Review of Systems   Respiratory: Positive for shortness of breath  Cardiovascular: Negative for chest pain  Gastrointestinal: Negative for constipation, diarrhea, nausea and vomiting  Musculoskeletal: Negative for arthralgias, gait problem, joint swelling (joint stiffness) and myalgias  Skin: Negative for rash  Neurological: Positive for dizziness  Negative for seizures and weakness  All other systems reviewed and are negative  History reviewed  No pertinent past medical history      Past Surgical History:   Procedure Laterality Date     SECTION         Family History   Problem Relation Age of Onset    Cancer Father        Social History     Occupational History    Not on file   Tobacco Use    Smoking status: Current Every Day Smoker    Smokeless tobacco: Never Used   Substance and Sexual Activity    Alcohol use: Yes    Drug use: Yes     Types: Marijuana    Sexual activity: Not Currently         Current Outpatient Medications:     cyclobenzaprine (FLEXERIL) 10 mg tablet, Take 1 PO HS PRN for pain/spasms  , Disp: 30 tablet, Rfl: 0    escitalopram (LEXAPRO) 10 mg tablet, Take 10 mg by mouth daily, Disp: , Rfl:     gabapentin (NEURONTIN) 400 mg capsule, Take 1 PO HS x 5 days, then 1 PO HS every other day x 4 days, then STOP , Disp: 7 capsule, Rfl: 0    ibuprofen (MOTRIN) 200 mg tablet, Take by mouth every 6 (six) hours as needed for mild pain, Disp: , Rfl:     Naproxen Sodium (ALEVE) 220 MG CAPS, Take 220 mg by mouth as needed, Disp: , Rfl:     Allergies   Allergen Reactions    Percocet [Oxycodone-Acetaminophen]        Physical Exam:    /64 (BP Location: Left arm, Patient Position: Sitting, Cuff Size: Standard)   Pulse 68   Ht 5' 4" (1 626 m)   Wt 59 9 kg (132 lb)   BMI 22 66 kg/m²     Constitutional:normal, well developed, well nourished, alert, in no distress and non-toxic and no overt pain behavior  Eyes:anicteric  HEENT:grossly intact  Neck:supple, symmetric, trachea midline and no masses   Pulmonary:even and unlabored  Cardiovascular:No edema or pitting edema present  Skin:Normal without rashes or lesions and well hydrated  Psychiatric:Mood and affect appropriate  Neurologic:Cranial Nerves II-XII grossly intact  Musculoskeletal:normal      Imaging  No orders to display         No orders of the defined types were placed in this encounter

## 2019-09-13 ENCOUNTER — TELEPHONE (OUTPATIENT)
Dept: PAIN MEDICINE | Facility: CLINIC | Age: 39
End: 2019-09-13

## 2019-09-13 DIAGNOSIS — G89.29 CHRONIC LEFT-SIDED LOW BACK PAIN WITH LEFT-SIDED SCIATICA: Primary | ICD-10-CM

## 2019-09-13 DIAGNOSIS — G89.4 CHRONIC PAIN SYNDROME: ICD-10-CM

## 2019-09-13 DIAGNOSIS — M54.42 CHRONIC LEFT-SIDED LOW BACK PAIN WITH LEFT-SIDED SCIATICA: Primary | ICD-10-CM

## 2019-09-13 DIAGNOSIS — M51.26 LUMBAR DISC HERNIATION: ICD-10-CM

## 2019-09-13 DIAGNOSIS — M25.551 HIP PAIN, ACUTE, RIGHT: ICD-10-CM

## 2019-09-13 NOTE — TELEPHONE ENCOUNTER
Patient   [phone]  Jamal Westz    Patient said that she went to the [facility] and spoke with her Dr  If it not hydrocortisone in the injection she may be ok  However she want's to know what you think first? Would she be able to get a shot in her right side, before she starts growing to much  Also she wanted to ask about physical therapy as well follow up patient

## 2019-09-13 NOTE — TELEPHONE ENCOUNTER
S/w pt, advised of above  Per pt, she is being [facility] in [facility], stated that she saw the NP, Cipriano Mohs today and has an ov w/ Dr Atif Quiroz on 10/8  Per pt, [facility] recommended PT  Advised pt, will relay this info to SL for his comment and cb to advise  Pt verbalized understanding and appreciation

## 2019-09-13 NOTE — TELEPHONE ENCOUNTER
S/w pt, stated that she was questioning a repeat injection in her back  Advised pt the radiation exposure is also something to consider, in addition to the medication  Explained to pt, the writer is awaiting DG's comment re: Injection and PT  The writer will cb to advise  Pt verbalized understanding and appreciation

## 2019-09-13 NOTE — TELEPHONE ENCOUNTER
S/w pt and advised the same  Pt not sure if she will be using a [facility]'s facility  Advised will mail script at this time

## 2019-09-13 NOTE — TELEPHONE ENCOUNTER
LMOM to cb  Provided cb number and office hours  Note:  Reviewed pt's 9/3 ov note - no reference to an injection  Would make pt aware that there is also a risk of radiation w/ fluroscopic guided injections  PT order?

## 2019-09-13 NOTE — TELEPHONE ENCOUNTER
With regards to a procedure, it is contraindicated in pregnancy for the medications and the fluoroscopy  As far as PT goes, can you get Dr Cassandra Boateng opinion as well as call her OB and see what their thoughts are with regards to maybe waiting until she is out of the 1st trimester  To be honest, I don't know how to handle this situation and am not comfortable saying it is ok to proceed with PT  Let see what Dr Waleska Perez and OB say  Thank you

## 2019-09-18 ENCOUNTER — TRANSCRIBE ORDERS (OUTPATIENT)
Dept: PERINATAL CARE | Facility: CLINIC | Age: 39
End: 2019-09-18

## 2019-09-18 DIAGNOSIS — O09.899 SUPERVISION OF OTHER HIGH RISK PREGNANCIES, UNSPECIFIED TRIMESTER: Primary | ICD-10-CM

## 2019-10-07 ENCOUNTER — CONSULT (OUTPATIENT)
Dept: PERINATAL CARE | Facility: CLINIC | Age: 39
End: 2019-10-07

## 2019-10-07 VITALS
SYSTOLIC BLOOD PRESSURE: 100 MMHG | HEIGHT: 64 IN | HEART RATE: 88 BPM | BODY MASS INDEX: 22.98 KG/M2 | WEIGHT: 134.6 LBS | DIASTOLIC BLOOD PRESSURE: 56 MMHG

## 2019-10-07 DIAGNOSIS — Z31.5 ENCOUNTER FOR PROCREATIVE GENETIC COUNSELING: ICD-10-CM

## 2019-10-07 DIAGNOSIS — O09.899 SUPERVISION OF OTHER HIGH RISK PREGNANCIES, UNSPECIFIED TRIMESTER: ICD-10-CM

## 2019-10-07 DIAGNOSIS — O09.529 ANTEPARTUM MULTIGRAVIDA OF ADVANCED MATERNAL AGE: Primary | ICD-10-CM

## 2019-10-07 DIAGNOSIS — O35.2XX0 HEREDITARY DISEASE IN FAMILY POSSIBLY AFFECTING FETUS, AFFECTING MANAGEMENT OF MOTHER IN PREGNANCY, SINGLE OR UNSPECIFIED FETUS: ICD-10-CM

## 2019-10-07 NOTE — PROGRESS NOTES
[id number] drawn per pt request  Pt given [id number] information pamphlet  Pt states she does not wish to call [facility] at this time  States she is ok to pay whatever the cost of the test is  Advised pt that cost can run close to $2000  Pt repeats that she is ok to pay whatever the cost is

## 2019-10-07 NOTE — PROGRESS NOTES
Genetic Counseling   High-Risk Gestation Note    Appointment Date:  10/7/2019  Referred By: Tod Schilder, [address]  YOB: 1980  Partner:  Wagner Thompson     Indication for Visit:  advanced maternal age    Pregnancy History:   Estimated Date of Delivery: 20  Estimated Gestational Age: 11w3d    Genetic Counseling:  Sd Tejeda is a 46year old female who is here to discuss maternal age related risks for aneuploidy  Issues Discussed:  average population risk- 3-4% in the average pregnancy of serious condition or birth defect  2-3% risk of intellectual disabilites  Not all detected by prenatal testing  Chromosomal: non-disjunction-  for Down syndrome and  for any chromosome abnormality at age 46 at delivery  Options Discussed:  Amniocentesis-risks and limitations discussed  CVS-Risks and limitations discussed  Ethnic screening discussed-clinical and genetic basis of CF, SMA, and expanded carrier screening  Variability and treatment addressed  Level II ultrasound to screen for structural anomalies  Nuchal translucency ultrasound - benefits and limitations discussed  Serum AFP screen recommended at 16-18 weeks to check for open neural tube defects  Cell free fetal DNA testing  Additional Information / Impression:  Sd Tejeda is a 46 y o  female who presented for genetic counseling to discuss maternal age related risks for aneuploidy as noted above  The testing and screening options were discussed  The risks, benefits, and limitations of amniocentesis were discussed with the patient  Amniocentesis is performed under direct real time ultrasound visualization to avoid both the fetus and the placenta  Once amniotic fluid is withdrawn, laboratory analysis is performed and amniotic fluid alpha-fetoprotein, as well as chromosome analysis is undertaken    The risk of genetic amniocentesis includes, but is not limited to less than 1 in 300 pregnancy loss rate or  delivery rate if 23 weeks or greater, infection, bleeding, rupture of membranes, failure of cells to grow, karyotype error, laboratory error, etc   Occasionally a repeat amniocentesis is necessary due to cell culture failure  Chromosome analysis from amniocentesis is 99 9% accurate and alpha-fetoprotein analysis can detect approximately 95% of open neural tube defects  Chorionic villus sampling (CVS) is another diagnostic testing option that is available earlier than amniocentesis, between 10-14 weeks gestation  Like amniocentesis, CVS is 99% accurate for detecting chromosomal problems  Unlike amniocentesis, CVS cannot detect alpha-fetoprotein levels in order to determine the risk for open neural tube defects  MSAFP testing would need to be performed at 15-20 weeks gestation for this purpose  The risk of CVS includes, but is not limited to, less than a 1 in 300 risk for pregnancy loss  There is also a 1% risk for maternal cell contamination and cell culture failure, in which case the CVS would need to be followed-up with amniocentesis  We reviewed the testing option of cell free fetal DNA screening (also known as noninvasive prenatal testing or NIPT)  We discussed that it is a serum test to identify fragments of fetal DNA in maternal blood  We reviewed the benefits and limitations of cell free fetal DNA screening in detecting Down syndrome, Trisomy 13, Trisomy 25 and sex chromosome aneuploidies  We also discussed that cell free fetal DNA screening does not detect additional chromosomal abnormalities and the possibility of a failed test result  As cell free fetal DNA screening does not detect open neural tube defects, MSAFP screening is available at 15-20 weeks gestation  We discussed the availability of an ultrasound between 11-14 weeks gestation to measure the nuchal translucency (NT), which can assess for chromosome abnormalities, cardiac defects, and other adverse pregnancy outcomes    We reviewed that level II anatomy ultrasound is typically performed at approximately 20 weeks gestation  Level II ultrasound evaluation is between 60-80% accurate in detecting major physical birth defects and variations in fetal development that may be associated with chromosome abnormalities  Level II ultrasound evaluation is not able to detect all birth defects or health problems  After discussing the available prenatal screening and testing options Blanca Laughlin elected to pursue cell free fetal DNA screening  Her blood was drawn immediately after the counseling session for Willma Iha  Results take approximately 7-10 days  The patient declined CVS and amniocentesis secondary to procedural related complications  She may reconsider diagnostic testing should the cell free fetal DNA screening come back abnormal   Blanca Laughlin is also planning on pursuing NT ultrasound, MSAFP screening, and Level II ultrasound at the appropriate times  Blanca Laughlin states that she was taking gabapentin until 9/9/19 at about 7 weeks gestation and expressed concern about possible harm to the pregnancy  We reviewed that every pregnancy has a general population background risk of 3-5% for congential anomalies  We can never test a pregnancy for all birth defects  Medication studies aim to see if there is an increased risk over the general population background risk for a specific pattern of malformation  Gabapentin (Neurontin) treatment of experimental animals was associated with fetal growth impairment and developmental delay  There are case reports of normal and abnormal pregnancy outcome after gabapentin therapy; small controlled studies have not suggested an increase in malformation risk  We reviewed the benefits and limitations of ultrasound in detecting congenital birth defects  The patient states that [address] mother was recently diagnosed with amyotrophic lateral sclerosis (ALS)   We discussed that about 90-95% of cases are sporadic, however about 5-10 cases are inherited in an autosomal dominant pattern  Susan Thompson is not aware of Chung Draper' mother had genetic testing and stated that Lucia Klein would most likely not be interested  I encouraged her to discuss this with her partner and that a referral for genetic evaluation can be provided if they are interested  Further review of family history for the patient and her partner was noncontributory  The family history was not significant for other genetic diseases or disorders, intellectual disability, birth defects, fetal loss, or consanguinity  Patient reports being of [place] decent and that her partner is of Occ[place] decent  She denies either of them having known Ashkenazi Pentecostalism ancestry  The benefits and limitations of Cystic fibrosis (CF), Spinal muscular atrophy (SMA), and expanded carrier screening was discussed  The patient elected to pursuenexpanded carrier screening pending insurance approval   Hemoglobin electrophoresis to screen for hemoglobinopathies is recommended through her referring OB provider if not previously performed  Lastly, we discussed the fact that everyone in the general population regardless of age, family history, or medical background has approximately a 3-5% risk of having a child with some type of congenital anomaly, genetic disease or intellectual disability  Currently there are no tests available to rule out all birth defects or health problems  Susan Thompson was provided with take home literature and our contact information  I encouraged her to call with any questions or concerns  Time spent with Genetic Counselor: 50 minutes      Plan / Tests Ordered:  1) Patient declined CVS and amniocentesis  2) Patient elected cell free fetal DNA testing - [id number] drawn following our appointment  3) Patient elected expanded carrier screening pending insurance approval   4) NT ultrasound by 14 weeks gestation    5) MSAFP screening at 15-20 weeks gestation  6) Level II anatomy ultrasound at approximately 20 weeks gestation

## 2019-10-15 ENCOUNTER — TELEPHONE (OUTPATIENT)
Dept: PERINATAL CARE | Facility: CLINIC | Age: 39
End: 2019-10-15

## 2019-10-15 NOTE — TELEPHONE ENCOUNTER
Called patient and confirmed date of birth  Discussed negative cell free fetal DNA test results for Trisomy 21, 13 and 18  Patient desired to learn fetal sex and was informed that it is male; to be confirmed at anatomy ultrasound  Informed patient that MSAFP screening needs to be done between 16-18 weeks gestation and the labslip will be mailed to her  Patient expressed verbal understanding and had no questions

## 2019-10-18 ENCOUNTER — ROUTINE PRENATAL (OUTPATIENT)
Dept: PERINATAL CARE | Facility: CLINIC | Age: 39
End: 2019-10-18
Payer: COMMERCIAL

## 2019-10-18 VITALS
DIASTOLIC BLOOD PRESSURE: 58 MMHG | HEIGHT: 64 IN | BODY MASS INDEX: 23.93 KG/M2 | WEIGHT: 140.2 LBS | HEART RATE: 86 BPM | SYSTOLIC BLOOD PRESSURE: 110 MMHG

## 2019-10-18 DIAGNOSIS — Z3A.12 12 WEEKS GESTATION OF PREGNANCY: ICD-10-CM

## 2019-10-18 DIAGNOSIS — O09.521 ELDERLY MULTIGRAVIDA, FIRST TRIMESTER: Primary | ICD-10-CM

## 2019-10-18 DIAGNOSIS — O99.331 TOBACCO SMOKING AFFECTING PREGNANCY IN FIRST TRIMESTER: ICD-10-CM

## 2019-10-18 PROCEDURE — 76813 OB US NUCHAL MEAS 1 GEST: CPT | Performed by: OBSTETRICS & GYNECOLOGY

## 2019-10-18 PROCEDURE — 99241 PR OFFICE CONSULTATION NEW/ESTAB PATIENT 15 MIN: CPT | Performed by: OBSTETRICS & GYNECOLOGY

## 2019-10-18 PROCEDURE — 76801 OB US < 14 WKS SINGLE FETUS: CPT | Performed by: OBSTETRICS & GYNECOLOGY

## 2019-10-18 RX ORDER — ASPIRIN 81 MG/1
162 TABLET, CHEWABLE ORAL DAILY
Qty: 180 TABLET | Refills: 1 | Status: SHIPPED | OUTPATIENT
Start: 2019-10-18 | End: 2020-01-16

## 2019-10-18 NOTE — PROGRESS NOTES
Thank you very much for your kind referral of Marina Drake for first-trimester ultrasound evaluation and MFM consult at [facility] on 2019  Ashwin Tellez is a 41-year-old  patient who is currently a 15 and 6/7 weeks gestation by an estimated due date of 2020 which is based upon menstrual dating  She presents for the indication of advanced maternal age  Jignesh Yates met recently with Isma Jacinto for genetic counseling and opted for genetic screening using cell free DNA analysis which was recently obtained in revealed negative results  Her prenatal course so far has been unremarkable  With the exception of occasional nausea and vomiting, she has no complaints  She denies vaginal bleeding  Jeff Darden received the influenza vaccine earlier in the pregnancy     Jeff Darden has a history two prior deliveries at term, the initial vaginally, the second by  section, following uncomplicated prenatal coursed  Each of her children is currently healthy  She has a history of herniated lumbar discs, not requiring surgery  Her past medical history is otherwise unremarkable with the exception of depression, for which she takes Lexapro  Her past surgical history is otherwise significant for a right oophorectomy by laparoscopy for the indication of an apparent ruptured cyst   She smokes 1/2 pack of cigarettes per day  The family medical history is negative with respect to first-degree relatives diabetes, hypertension, or venous thromboembolism  Jeff Darden has a sister who had preeclampsia in one of her pregnancies  Today's ultrasound findings and suggested follow-up were discussed in detail with Jeff Darden  Her non invasive prenatal testing results were discussed   MSAFP screening is recommended at 16 to 20 weeks gestation  Jeff Darden will return for detailed MFM fetal anatomy assessment at 20 weeks gestation    Her age and history of a first-degree family member with preeclampsia increase her personal risk to develop preeclampsia  I recommended that [facility] initiate prophylaxis with 162 mg of aspirin a day, which will significantly reduce her risk  Aspirin was ordered for [facility] at her visit  Continuation of daily low dose aspirin therapy is recommended until delivery  We discussed that tobacco use during pregnancy is associated with an increased risk for adverse outcomes including  birth, fetal growth restriction, abruption, and stillbirth  There is also an association with an increased risk for SIDS  Enrollment in a smoking cessation program should be considered  Citalopram  (Celexa) is a serotonin reuptake inhibitor used as an antidepressant  Escitalopram, the active enantiomer, is marketed as Lexapro  Based on experimental animal studies and human reports, therapeutic use of citalopram or escitalopram is not expected to increase the risk of congenital anomalies  Use of serotonin reuptake inhibitors late in pregnancy can be associated with a mild transient  syndrome of central nervous system, motor, respiratory, and gastrointestinal signs  The face to face time, in addition to time spent discussing ultrasound results, was approximately 15 minutes, greater than 50% of which was spent during counseling and coordination of care

## 2019-10-21 DIAGNOSIS — M54.16 LUMBAR RADICULOPATHY: ICD-10-CM

## 2019-10-21 DIAGNOSIS — M54.42 CHRONIC LEFT-SIDED LOW BACK PAIN WITH LEFT-SIDED SCIATICA: ICD-10-CM

## 2019-10-21 DIAGNOSIS — G89.29 CHRONIC LEFT-SIDED LOW BACK PAIN WITH LEFT-SIDED SCIATICA: ICD-10-CM

## 2019-10-21 RX ORDER — GABAPENTIN 400 MG/1
CAPSULE ORAL
Qty: 60 CAPSULE | Refills: 0 | OUTPATIENT
Start: 2019-10-21

## 2019-10-29 ENCOUNTER — TELEPHONE (OUTPATIENT)
Dept: PERINATAL CARE | Facility: CLINIC | Age: 39
End: 2019-10-29

## 2019-10-29 NOTE — TELEPHONE ENCOUNTER
Received notice from patient's insurance that [employer] expanded carrier screening was approved (Auth # [id number], until 12/22/19)  Called patient and left message to let her know prior Dicky Foot was obtained and she can get her blood drawn at any [facility] or [facility]'s location  The labslip will be mailed to her  Also informed her that if she has an unmet deductible or a co-insurance there may be some out of pocket expense for the testing  If she gets a bill in the mail for over $300 she can call the number on it and sign up for the Mom's Helping Mom's program which can lower the cost to $299  Provided the genetic counseling phone number if she has questions or concerns  She will be contacted when results are available

## 2019-12-19 DIAGNOSIS — M54.16 LUMBAR RADICULOPATHY: ICD-10-CM

## 2019-12-19 DIAGNOSIS — G89.29 CHRONIC LEFT-SIDED LOW BACK PAIN WITH LEFT-SIDED SCIATICA: ICD-10-CM

## 2019-12-19 DIAGNOSIS — M54.42 CHRONIC LEFT-SIDED LOW BACK PAIN WITH LEFT-SIDED SCIATICA: ICD-10-CM

## 2019-12-19 RX ORDER — GABAPENTIN 400 MG/1
CAPSULE ORAL
Qty: 60 CAPSULE | Refills: 0 | OUTPATIENT
Start: 2019-12-19

## 2019-12-20 ENCOUNTER — ROUTINE PRENATAL (OUTPATIENT)
Dept: PERINATAL CARE | Facility: CLINIC | Age: 39
End: 2019-12-20
Payer: COMMERCIAL

## 2019-12-20 VITALS
SYSTOLIC BLOOD PRESSURE: 98 MMHG | HEIGHT: 64 IN | DIASTOLIC BLOOD PRESSURE: 56 MMHG | HEART RATE: 91 BPM | WEIGHT: 151.8 LBS | BODY MASS INDEX: 25.92 KG/M2

## 2019-12-20 DIAGNOSIS — Z36.86 ENCOUNTER FOR ANTENATAL SCREENING FOR CERVICAL LENGTH: ICD-10-CM

## 2019-12-20 DIAGNOSIS — O09.522: Primary | ICD-10-CM

## 2019-12-20 DIAGNOSIS — Z3A.21 21 WEEKS GESTATION OF PREGNANCY: ICD-10-CM

## 2019-12-20 PROCEDURE — 76811 OB US DETAILED SNGL FETUS: CPT | Performed by: OBSTETRICS & GYNECOLOGY

## 2019-12-20 PROCEDURE — 76817 TRANSVAGINAL US OBSTETRIC: CPT | Performed by: OBSTETRICS & GYNECOLOGY

## 2019-12-20 PROCEDURE — 99212 OFFICE O/P EST SF 10 MIN: CPT | Performed by: OBSTETRICS & GYNECOLOGY

## 2019-12-20 NOTE — PROGRESS NOTES
The patient was seen today for an ultrasound  Please see ultrasound report (located under Ob Procedures) for additional details  Thank you very much for allowing us to participate in the care of this very nice patient  Should you have any questions, please do not hesitate to contact me  Jadon Cardenas MD [address]  Attending Physician, [place]

## 2019-12-24 ENCOUNTER — TELEPHONE (OUTPATIENT)
Dept: PERINATAL CARE | Facility: CLINIC | Age: 39
End: 2019-12-24

## 2019-12-24 NOTE — TELEPHONE ENCOUNTER
Ye Jackson called regarding patients previous October and December appointments at [facility]  They've noticed that office notes did not discuss the patients marijuana use and would like for the Doctor to address this at her next visit and chart this

## 2020-01-06 ENCOUNTER — TELEPHONE (OUTPATIENT)
Dept: PERINATAL CARE | Facility: CLINIC | Age: 40
End: 2020-01-06

## 2020-01-18 DIAGNOSIS — G89.29 CHRONIC LEFT-SIDED LOW BACK PAIN WITH LEFT-SIDED SCIATICA: ICD-10-CM

## 2020-01-18 DIAGNOSIS — M54.42 CHRONIC LEFT-SIDED LOW BACK PAIN WITH LEFT-SIDED SCIATICA: ICD-10-CM

## 2020-01-18 DIAGNOSIS — M54.16 LUMBAR RADICULOPATHY: ICD-10-CM

## 2020-01-20 RX ORDER — GABAPENTIN 400 MG/1
CAPSULE ORAL
Qty: 60 CAPSULE | Refills: 0 | OUTPATIENT
Start: 2020-01-20

## 2020-03-19 ENCOUNTER — TELEPHONE (OUTPATIENT)
Dept: PERINATAL CARE | Facility: OTHER | Age: 40
End: 2020-03-19

## 2020-03-19 NOTE — TELEPHONE ENCOUNTER
PATRICK Telephone Note:    Attempted to reach patient by phone and voicemail to confirm appointment for her ultrasound  At this time we are seeing regularly scheduled appointments  Patient was asked that if they were not feeling well, have cough, fever or Shortness of Breath to call and reschedule their appointment once all symptoms have resolved  If you have any of these symptoms please contact your primary care physician or [phone]  Pt was notified that there are NO visitors or support people to be present for their appointment

## 2020-03-20 ENCOUNTER — ULTRASOUND (OUTPATIENT)
Dept: PERINATAL CARE | Facility: CLINIC | Age: 40
End: 2020-03-20
Payer: COMMERCIAL

## 2020-03-20 VITALS — SYSTOLIC BLOOD PRESSURE: 102 MMHG | HEART RATE: 104 BPM | DIASTOLIC BLOOD PRESSURE: 58 MMHG

## 2020-03-20 DIAGNOSIS — O09.523 ANTEPARTUM ELDERLY MULTIGRAVIDA, THIRD TRIMESTER: Primary | ICD-10-CM

## 2020-03-20 DIAGNOSIS — Z3A.34 34 WEEKS GESTATION OF PREGNANCY: ICD-10-CM

## 2020-03-20 PROCEDURE — 76816 OB US FOLLOW-UP PER FETUS: CPT | Performed by: OBSTETRICS & GYNECOLOGY

## 2020-03-20 RX ORDER — FERROUS SULFATE 325(65) MG
325 TABLET ORAL
COMMUNITY

## 2020-03-20 NOTE — PROGRESS NOTES
Fetal ultrasound at 34 6/7   weeks gestation  to evaluate growth  See Ob procedures in EPIC  Ultrasound:  1  Fetal size = dates  2   No fetal anomalies observed  3  Normal KATIA  I reviewed the results of this ultrasound and answered  all the questions  Recommendations:  1  Follow-up ultrasound as clinically indicated  Thank you for referring your patient to our offices  The limitations of ultrasound to detect all anomalies was reviewed and how it is not  a test to rule out aneuploidy  If you have any further questions do not hesitate to contact us as [phone]       Olimpia Bee MD